# Patient Record
Sex: MALE | Race: WHITE | NOT HISPANIC OR LATINO | Employment: FULL TIME | URBAN - NONMETROPOLITAN AREA
[De-identification: names, ages, dates, MRNs, and addresses within clinical notes are randomized per-mention and may not be internally consistent; named-entity substitution may affect disease eponyms.]

---

## 2023-10-04 ENCOUNTER — APPOINTMENT (OUTPATIENT)
Dept: RADIOLOGY | Facility: HOSPITAL | Age: 33
End: 2023-10-04
Payer: COMMERCIAL

## 2023-10-04 ENCOUNTER — HOSPITAL ENCOUNTER (EMERGENCY)
Facility: HOSPITAL | Age: 33
Discharge: HOME | End: 2023-10-04
Attending: EMERGENCY MEDICINE | Admitting: EMERGENCY MEDICINE
Payer: COMMERCIAL

## 2023-10-04 VITALS
HEART RATE: 62 BPM | SYSTOLIC BLOOD PRESSURE: 111 MMHG | HEIGHT: 68 IN | TEMPERATURE: 98.4 F | RESPIRATION RATE: 16 BRPM | DIASTOLIC BLOOD PRESSURE: 66 MMHG | WEIGHT: 155 LBS | OXYGEN SATURATION: 98 % | BODY MASS INDEX: 23.49 KG/M2

## 2023-10-04 DIAGNOSIS — S63.501A SPRAIN OF RIGHT WRIST, INITIAL ENCOUNTER: ICD-10-CM

## 2023-10-04 DIAGNOSIS — S50.11XA CONTUSION OF RIGHT FOREARM, INITIAL ENCOUNTER: Primary | ICD-10-CM

## 2023-10-04 PROCEDURE — 73090 X-RAY EXAM OF FOREARM: CPT | Mod: RT,FY

## 2023-10-04 PROCEDURE — 73110 X-RAY EXAM OF WRIST: CPT | Mod: RIGHT SIDE | Performed by: RADIOLOGY

## 2023-10-04 PROCEDURE — 73590 X-RAY EXAM OF LOWER LEG: CPT | Mod: RIGHT SIDE | Performed by: RADIOLOGY

## 2023-10-04 PROCEDURE — 99284 EMERGENCY DEPT VISIT MOD MDM: CPT | Performed by: EMERGENCY MEDICINE

## 2023-10-04 PROCEDURE — 73110 X-RAY EXAM OF WRIST: CPT | Mod: RT,FY

## 2023-10-04 RX ORDER — OMEPRAZOLE 40 MG/1
40 CAPSULE, DELAYED RELEASE ORAL
COMMUNITY
End: 2023-12-12 | Stop reason: WASHOUT

## 2023-10-04 ASSESSMENT — PAIN SCALES - GENERAL
PAINLEVEL_OUTOF10: 2
PAINLEVEL_OUTOF10: 6

## 2023-10-04 ASSESSMENT — PAIN - FUNCTIONAL ASSESSMENT
PAIN_FUNCTIONAL_ASSESSMENT: 0-10
PAIN_FUNCTIONAL_ASSESSMENT: 0-10

## 2023-10-04 ASSESSMENT — COLUMBIA-SUICIDE SEVERITY RATING SCALE - C-SSRS
6. HAVE YOU EVER DONE ANYTHING, STARTED TO DO ANYTHING, OR PREPARED TO DO ANYTHING TO END YOUR LIFE?: NO
2. HAVE YOU ACTUALLY HAD ANY THOUGHTS OF KILLING YOURSELF?: NO
1. IN THE PAST MONTH, HAVE YOU WISHED YOU WERE DEAD OR WISHED YOU COULD GO TO SLEEP AND NOT WAKE UP?: NO

## 2023-10-04 NOTE — ED PROVIDER NOTES
HPI   Chief Complaint   Patient presents with    Arm Injury     Wrist and forearm pain with redness and swelling       This 32-year-old male here with a chief complaint of right forearm and wrist pain.  Patient reports that he initially injured this a few months ago while doing some exercise.  He states he has been using it repetitively at work and noticed some swelling over the distal right forearm.  He does complain of some wrist pain.  He is right-hand dominant.  He denies any fall on the outstretched hand.  He says that he was doing some boxing and punched something.  He denies any hand pain per se.  No numbness tingling or weakness of the hand.  He denies any shoulder pain upper arm pain or elbow pain.    A complete review of systems is otherwise negative except as noted above.  Past medical history is reviewed below.  Past surgical history is reviewed.  Social history is reviewed below.  Patient denies any medication allergies.                          No data recorded                Patient History   Past Medical History:   Diagnosis Date    GERD (gastroesophageal reflux disease)      Past Surgical History:   Procedure Laterality Date    HEMORRHOID SURGERY      HERNIA REPAIR       No family history on file.  Social History     Tobacco Use    Smoking status: Never    Smokeless tobacco: Never   Vaping Use    Vaping Use: Never used   Substance Use Topics    Alcohol use: Not Currently    Drug use: Never       Physical Exam   ED Triage Vitals [10/04/23 1023]   Temp Heart Rate Resp BP   36.9 °C (98.4 °F) 62 16 111/66      SpO2 Temp Source Heart Rate Source Patient Position   98 % Tympanic -- --      BP Location FiO2 (%)     -- --       Physical Exam  Vitals and nursing note reviewed.   Constitutional:       General: He is not in acute distress.     Appearance: He is well-developed.   HENT:      Head: Normocephalic and atraumatic.   Eyes:      Conjunctiva/sclera: Conjunctivae normal.   Cardiovascular:      Rate and  Rhythm: Normal rate and regular rhythm.      Heart sounds: No murmur heard.  Pulmonary:      Effort: Pulmonary effort is normal. No respiratory distress.      Breath sounds: Normal breath sounds.   Abdominal:      Palpations: Abdomen is soft.      Tenderness: There is no abdominal tenderness.   Musculoskeletal:         General: Swelling, tenderness and signs of injury present. No deformity.      Cervical back: Neck supple.      Right lower leg: No edema.      Left lower leg: No edema.      Comments: Right upper extremity revealed no significant pain palpation of the right shoulder humerus elbow or proximal forearm.  Over the distal one third of the right forearm there is some mild swelling and ecchymosis.  No wrist revealed no significant gross bony abnormality.  No snuffbox tenderness.  Right hand itself is neurovascularly intact.   Skin:     General: Skin is warm and dry.      Capillary Refill: Capillary refill takes less than 2 seconds.   Neurological:      General: No focal deficit present.      Mental Status: He is alert and oriented to person, place, and time.   Psychiatric:         Mood and Affect: Mood normal.       XR forearm right 2 views   Final Result   No acute fracture or dislocation in the current exams.        Old 5th metacarpal fracture deformity.        Tiny incidental smoothly marginated exostosis projecting from the   distal volar cortex of the humeral diaphysis.        Signed by: Martin Walden 10/4/2023 10:51 AM   Dictation workstation:   [id number]      XR wrist right 3+ views   Final Result   No acute fracture or dislocation in the current exams.        Old 5th metacarpal fracture deformity.        Tiny incidental smoothly marginated exostosis projecting from the   distal volar cortex of the humeral diaphysis.        Signed by: Martin Walden 10/4/2023 10:51 AM   Dictation workstation:   [id number]           ED Course & MDM   Diagnoses as of 10/04/23 1100   Contusion of right forearm, initial  encounter   Sprain of right wrist, initial encounter       Medical Decision Making  Patient was seen and examined by me.  X-ray imaging of the right forearm and right wrist is obtained.    X-ray imaging of the right forearm 2 bones interpreted by me.  No fracture.  No malalignment.  No acute osseous abnormality.  X-ray imaging of the right wrist interpreted by me.  No fracture or dislocation.  No gross osseous deformity or injury.    Clinically I see no evidence of acute fracture.  He says he does a lot of repetitive stuff at work.  The patient be placed in an Ace wrap on the right forearm.  Ibuprofen as needed for pain.  Rest ice elevation.  Follow-up with primary care and/or orthopedics as needed.               Dave Greenberg, DO  10/04/23 1103

## 2023-10-04 NOTE — Clinical Note
Arcenio Ny was seen and treated in our emergency department on 10/4/2023.  He may return to work on 10/05/2023.       If you have any questions or concerns, please don't hesitate to call.      Dave Greenberg, DO

## 2023-12-05 ENCOUNTER — APPOINTMENT (OUTPATIENT)
Dept: PRIMARY CARE | Facility: CLINIC | Age: 33
End: 2023-12-05
Payer: COMMERCIAL

## 2023-12-07 PROBLEM — F15.21: Chronic | Status: ACTIVE | Noted: 2023-07-26

## 2023-12-07 PROBLEM — F32.1 CURRENT MODERATE EPISODE OF MAJOR DEPRESSIVE DISORDER WITHOUT PRIOR EPISODE (MULTI): Chronic | Status: ACTIVE | Noted: 2023-07-26

## 2023-12-07 PROBLEM — T40.1X1A HEROIN OVERDOSE (MULTI): Status: ACTIVE | Noted: 2017-07-19

## 2023-12-07 PROBLEM — F41.1 GENERALIZED ANXIETY DISORDER: Status: ACTIVE | Noted: 2023-07-26

## 2023-12-07 PROBLEM — F11.10 HEROIN ABUSE (MULTI): Status: ACTIVE | Noted: 2023-12-07

## 2023-12-07 PROBLEM — F10.90 ALCOHOL USE DISORDER: Status: ACTIVE | Noted: 2017-09-28

## 2023-12-07 PROBLEM — K21.9 GERD (GASTROESOPHAGEAL REFLUX DISEASE): Status: ACTIVE | Noted: 2023-12-07

## 2023-12-07 PROBLEM — K62.5 RECTAL BLEED: Status: ACTIVE | Noted: 2017-06-12

## 2023-12-07 PROBLEM — B19.20 HEPATITIS C: Status: ACTIVE | Noted: 2023-12-07

## 2023-12-07 PROBLEM — F17.200 TOBACCO DEPENDENCE: Status: ACTIVE | Noted: 2023-12-07

## 2023-12-12 ENCOUNTER — OFFICE VISIT (OUTPATIENT)
Dept: PRIMARY CARE | Facility: CLINIC | Age: 33
End: 2023-12-12
Payer: COMMERCIAL

## 2023-12-12 VITALS
HEART RATE: 82 BPM | DIASTOLIC BLOOD PRESSURE: 65 MMHG | OXYGEN SATURATION: 98 % | BODY MASS INDEX: 24.19 KG/M2 | HEIGHT: 68 IN | WEIGHT: 159.6 LBS | SYSTOLIC BLOOD PRESSURE: 108 MMHG

## 2023-12-12 DIAGNOSIS — K21.9 GASTROESOPHAGEAL REFLUX DISEASE WITHOUT ESOPHAGITIS: ICD-10-CM

## 2023-12-12 DIAGNOSIS — R10.84 GENERALIZED ABDOMINAL PAIN: ICD-10-CM

## 2023-12-12 DIAGNOSIS — B19.20 HEPATITIS C VIRUS INFECTION WITHOUT HEPATIC COMA, UNSPECIFIED CHRONICITY: Primary | ICD-10-CM

## 2023-12-12 DIAGNOSIS — K59.09 OTHER CONSTIPATION: ICD-10-CM

## 2023-12-12 DIAGNOSIS — F32.1 CURRENT MODERATE EPISODE OF MAJOR DEPRESSIVE DISORDER WITHOUT PRIOR EPISODE (MULTI): Chronic | ICD-10-CM

## 2023-12-12 PROCEDURE — 99214 OFFICE O/P EST MOD 30 MIN: CPT | Performed by: REGISTERED NURSE

## 2023-12-12 PROCEDURE — 1036F TOBACCO NON-USER: CPT | Performed by: REGISTERED NURSE

## 2023-12-12 RX ORDER — ATORVASTATIN CALCIUM 40 MG/1
40 TABLET, FILM COATED ORAL NIGHTLY
COMMUNITY
Start: 2023-10-27 | End: 2023-12-12 | Stop reason: WASHOUT

## 2023-12-12 RX ORDER — ACETAMINOPHEN 500 MG
500 TABLET ORAL EVERY 6 HOURS PRN
COMMUNITY
Start: 2023-09-06 | End: 2023-12-12 | Stop reason: WASHOUT

## 2023-12-12 RX ORDER — MICONAZOLE NITRATE 2 %
2 CREAM (GRAM) TOPICAL
COMMUNITY
Start: 2023-10-27 | End: 2023-12-12 | Stop reason: ALTCHOICE

## 2023-12-12 RX ORDER — TRAMADOL HYDROCHLORIDE 50 MG/1
TABLET ORAL
COMMUNITY
Start: 2023-09-06 | End: 2023-12-12 | Stop reason: ALTCHOICE

## 2023-12-12 RX ORDER — CHLORHEXIDINE GLUCONATE ORAL RINSE 1.2 MG/ML
SOLUTION DENTAL
COMMUNITY
Start: 2023-09-06 | End: 2023-12-12 | Stop reason: ALTCHOICE

## 2023-12-12 RX ORDER — DOCUSATE SODIUM 100 MG/1
100 CAPSULE, LIQUID FILLED ORAL 2 TIMES DAILY
Qty: 180 CAPSULE | Refills: 3 | Status: SHIPPED | OUTPATIENT
Start: 2023-12-12 | End: 2024-12-11

## 2023-12-12 RX ORDER — MULTIPLE VITAMINS W/ MINERALS TAB 9MG-400MCG
1 TAB ORAL DAILY
COMMUNITY
Start: 2023-10-27 | End: 2023-12-12 | Stop reason: WASHOUT

## 2023-12-12 RX ORDER — IBUPROFEN/PSEUDOEPHEDRINE HCL 200MG-30MG
3 TABLET ORAL
COMMUNITY
Start: 2023-10-27 | End: 2023-12-12 | Stop reason: WASHOUT

## 2023-12-12 RX ORDER — ARIPIPRAZOLE 15 MG/1
TABLET ORAL
COMMUNITY
Start: 2023-11-11

## 2023-12-12 ASSESSMENT — ENCOUNTER SYMPTOMS
DIZZINESS: 0
EYE REDNESS: 0
COUGH: 0
SHORTNESS OF BREATH: 0
CONFUSION: 0
CHILLS: 0
WOUND: 0
EYE DISCHARGE: 0
DIARRHEA: 0
NAUSEA: 0
NERVOUS/ANXIOUS: 0
RHINORRHEA: 0
WHEEZING: 0
ABDOMINAL PAIN: 1
WEAKNESS: 0
FREQUENCY: 0
DIFFICULTY URINATING: 0
FEVER: 0
CONSTIPATION: 1
VOMITING: 0
HEADACHES: 0

## 2023-12-12 NOTE — PROGRESS NOTES
Subjective   Patient ID: Arcenio Ny is a 32 y.o. male who presents for Establish Care (Stomach issues, for a while; he feels he's going to the bathroom more than normal, Bms; denies pain but Bm's are hard; sometimes he gets pain after eating; ).    HPI   Goes by [employer]    Stomach issues: has been going on for a while, going to the bathroom more than normal. BM's are hard, sometimes having pain after eating. Started about a year ago. Had an ultrasound abdomen done and unremarkable. Bread and dairy make him worse. He is having cramping and stabbing pain in the same area. Right side abdomen.Constant pain for a while and then it goes away. Mostly after eating. He seems to go a lot more, has to go right after he eats at times. Tried omeprazole and this did not help. Taking baking soda water now.   Eats fruits and veggies, more chicken vs. Red meats. Water and black coffee and tea. Drinks about 2-3 cups of coffee per day, and 2-3 cups of tea and water during the rest.     Depression: he is taking Abilify.  He follows with Brigitte Karimi. Counseling every other week.     Hepatitis: was treated and no longer having issues     All other systems reviewed and negative for complaint unless stated above.    Surgery: Hemorrhoid and hernia   Family: none   smoker:  former   Etoh: occasional   Drug use: marijuana on occasion, used to use heroin stopped 2/6/21  He does not follow with [facility] or anything, doing well.   Review of Systems   Constitutional:  Negative for chills and fever.   HENT:  Negative for congestion, ear pain and rhinorrhea.    Eyes:  Negative for discharge and redness.   Respiratory:  Negative for cough, shortness of breath and wheezing.    Cardiovascular:  Negative for chest pain and leg swelling.   Gastrointestinal:  Positive for abdominal pain and constipation. Negative for diarrhea, nausea and vomiting.   Genitourinary:  Negative for difficulty urinating, frequency and urgency.   Musculoskeletal:   "Negative for gait problem.   Skin:  Negative for rash and wound.   Neurological:  Negative for dizziness, weakness and headaches.   Psychiatric/Behavioral:  Negative for confusion. The patient is not nervous/anxious.        Objective   /65 (BP Location: Right arm, Patient Position: Sitting, BP Cuff Size: Adult)   Pulse 82   Ht 1.727 m (5' 8\")   Wt 72.4 kg (159 lb 9.6 oz)   SpO2 98%   BMI 24.27 kg/m²     Physical Exam  Vitals reviewed.   Constitutional:       Appearance: Normal appearance.   HENT:      Head: Normocephalic.      Right Ear: Tympanic membrane, ear canal and external ear normal.      Left Ear: Tympanic membrane, ear canal and external ear normal.      Nose: No rhinorrhea.      Mouth/Throat:      Mouth: Mucous membranes are moist.      Pharynx: Oropharynx is clear.   Eyes:      Pupils: Pupils are equal, round, and reactive to light.   Cardiovascular:      Rate and Rhythm: Normal rate and regular rhythm.      Pulses: Normal pulses.   Pulmonary:      Effort: Pulmonary effort is normal.      Breath sounds: Normal breath sounds.   Abdominal:      General: Abdomen is flat. Bowel sounds are normal.      Palpations: Abdomen is soft.   Musculoskeletal:         General: No tenderness. Normal range of motion.      Right lower leg: No edema.      Left lower leg: No edema.   Lymphadenopathy:      Cervical: No cervical adenopathy.   Skin:     General: Skin is warm and dry.      Findings: No rash.   Neurological:      Mental Status: He is alert and oriented to person, place, and time.   Psychiatric:         Mood and Affect: Mood normal.         Behavior: Behavior normal.         Assessment/Plan   Diagnoses and all orders for this visit:  Hepatitis C virus infection without hepatic coma, unspecified chronicity  - no acute issues   - resolved   Current moderate episode of major depressive disorder without prior episode (CMS/HCC)  - continue following with [facility]   - continue Abilify   Other " constipation  -     docusate sodium (Colace) 100 mg capsule; Take 1 capsule (100 mg) by mouth 2 times a day.  Generalized abdominal pain  -     Food Allergy Profile IgE; Future  -     CBC and Auto Differential; Future  -     Comprehensive Metabolic Panel; Future  Gastroesophageal reflux disease without esophagitis     Follow up in 3 months to assess symptoms

## 2024-03-07 RX ORDER — BUSPIRONE HYDROCHLORIDE 5 MG/1
1 TABLET ORAL 3 TIMES DAILY
COMMUNITY
Start: 2024-02-26

## 2024-03-07 RX ORDER — DOXEPIN HYDROCHLORIDE 25 MG/1
CAPSULE ORAL
COMMUNITY
Start: 2024-02-26

## 2024-03-07 NOTE — PROGRESS NOTES
"Subjective   Patient ID: Arcenio Ny is a 33 y.o. male who presents for Follow-up (Stomach issues have decreased some since starting medication; ).    HPI   Prefers Liborio     Stomach issues: Colace is helping. More regular and not as hard to have BM's anymore. Denies any other symptoms. Did not get blood work done.     Depression: he is taking Abilify.  He follows with Brigitte Karimi. Counseling every other week.      Hepatitis: was treated and no longer having issues      All other systems reviewed and negative for complaint unless stated above.    Review of Systems    Objective   /84 (BP Location: Right arm, Patient Position: Sitting, BP Cuff Size: Adult)   Pulse 80   Ht 1.727 m (5' 8\")   Wt 75.8 kg (167 lb)   BMI 25.39 kg/m²     Physical Exam  Constitutional:       Appearance: Normal appearance.   Cardiovascular:      Rate and Rhythm: Normal rate and regular rhythm.   Pulmonary:      Effort: Pulmonary effort is normal.      Breath sounds: Normal breath sounds.   Abdominal:      General: Bowel sounds are normal.      Palpations: Abdomen is soft.   Skin:     General: Skin is warm and dry.   Neurological:      Mental Status: He is alert and oriented to person, place, and time. Mental status is at baseline.   Psychiatric:         Mood and Affect: Mood normal.         Behavior: Behavior normal.         Assessment/Plan          Hepatitis C virus infection without hepatic coma, unspecified chronicity  - no acute issues   - resolved   Current moderate episode of major depressive disorder without prior episode (CMS/HCC)  - continue following with [facility]   - continue Abilify   Other constipation  -     docusate sodium (Colace) 100 mg capsule; Take 1 capsule (100 mg) by mouth 2 times a day.  Generalized abdominal pain  -     Food Allergy Profile IgE; Future  -     CBC and Auto Differential; Future  -     Comprehensive Metabolic Panel; Future       "

## 2024-03-13 ENCOUNTER — OFFICE VISIT (OUTPATIENT)
Dept: PRIMARY CARE | Facility: CLINIC | Age: 34
End: 2024-03-13
Payer: COMMERCIAL

## 2024-03-13 VITALS
DIASTOLIC BLOOD PRESSURE: 84 MMHG | HEART RATE: 80 BPM | WEIGHT: 167 LBS | HEIGHT: 68 IN | SYSTOLIC BLOOD PRESSURE: 133 MMHG | BODY MASS INDEX: 25.31 KG/M2

## 2024-03-13 DIAGNOSIS — K21.9 GASTROESOPHAGEAL REFLUX DISEASE WITHOUT ESOPHAGITIS: Primary | ICD-10-CM

## 2024-03-13 DIAGNOSIS — K59.09 OTHER CONSTIPATION: ICD-10-CM

## 2024-03-13 PROCEDURE — 1036F TOBACCO NON-USER: CPT | Performed by: REGISTERED NURSE

## 2024-03-13 PROCEDURE — 99213 OFFICE O/P EST LOW 20 MIN: CPT | Performed by: REGISTERED NURSE

## 2024-03-13 RX ORDER — IBUPROFEN 600 MG/1
TABLET ORAL
COMMUNITY
Start: 2024-02-01

## 2025-03-17 ENCOUNTER — APPOINTMENT (OUTPATIENT)
Dept: PRIMARY CARE | Facility: CLINIC | Age: 35
End: 2025-03-17
Payer: COMMERCIAL

## 2025-06-17 NOTE — PROGRESS NOTES
Subjective   Patient ID: Arcenio Ny is a 34 y.o. male who presents for No chief complaint on file..    [facility] [place]    Prefers Liborio      Stomach issues: Colace is helping. More regular and not as hard to have BM's anymore. Denies any other symptoms. Did not get blood work done.      Depression: he is taking Abilify.  He follows with Brigitte Karimi. Counseling every other week.      Hepatitis: was treated and no longer having issues      All other systems reviewed and negative for complaint unless stated above.    Review of systems completed and unremarkable other than what is documented in [facility] [place].    Objective   There were no vitals taken for this visit.    No data recorded    Physical Exam    Gen: No acute distress, alert and oriented x3, pleasant   HEENT: moist mucous membranes, b/l external auditory canals are clear of debris, TMs within normal limits, no oropharyngeal lesions, eomi, perrla   Neck: thyroid within normal limits, no lymphadenopathy   CV: RRR, normal S1/S2, no murmur   Resp: Clear to auscultation bilaterally, no wheezes or rhonchi appreciated  Abd: soft, nontender, non-distended, no guarding/rigidity, bowel sounds present  Extr: no edema, no calf tenderness  Derm: Skin is warm and dry, no rashes appreciated  Psych: mood is good, affect is congruent, good hygiene, normal speech and eye contact  Neuro: cranial nerves grossly intact, normal gait      Assessment/Plan   {Assess/PlanSmartLinks:68201}      Hepatitis C virus infection without hepatic coma, unspecified chronicity  - no acute issues   - resolved   Current moderate episode of major depressive disorder without prior episode (CMS/HCC)  - continue following with [facility]   - continue Abilify   Other constipation  -     docusate sodium (Colace) 100 mg capsule; Take 1 capsule (100 mg) by mouth 2 times a day   urticaria  -     [facility] MISCELLANEOUS TEST (ROOM TEMPERATURE); Future  Health management deficit  -     Lipid Panel; Future  Screening for diabetes mellitus (DM)  -     Hemoglobin A1C; Future  -     Comprehensive metabolic panel; Future  Hepatitis C virus infection without hepatic coma, unspecified chronicity  - no acute issues   - resolved   Current moderate episode of major depressive disorder without prior episode (CMS/HCC)  - continue following with [facility]

## 2025-06-24 ENCOUNTER — APPOINTMENT (OUTPATIENT)
Dept: PRIMARY CARE | Facility: CLINIC | Age: 35
End: 2025-06-24
Payer: COMMERCIAL

## 2025-06-24 VITALS
SYSTOLIC BLOOD PRESSURE: 124 MMHG | WEIGHT: 163.6 LBS | BODY MASS INDEX: 24.88 KG/M2 | HEART RATE: 72 BPM | DIASTOLIC BLOOD PRESSURE: 73 MMHG

## 2025-06-24 DIAGNOSIS — B19.20 HEPATITIS C VIRUS INFECTION WITHOUT HEPATIC COMA, UNSPECIFIED CHRONICITY: ICD-10-CM

## 2025-06-24 DIAGNOSIS — Z13.1 SCREENING FOR DIABETES MELLITUS (DM): ICD-10-CM

## 2025-06-24 DIAGNOSIS — L50.1 ACUTE IDIOPATHIC URTICARIA: Primary | ICD-10-CM

## 2025-06-24 DIAGNOSIS — Z78.9 HEALTH MANAGEMENT DEFICIT: ICD-10-CM

## 2025-06-24 PROBLEM — T40.1X1A HEROIN OVERDOSE (MULTI): Status: RESOLVED | Noted: 2017-07-19 | Resolved: 2025-06-24

## 2025-06-24 PROBLEM — F15.21: Chronic | Status: RESOLVED | Noted: 2023-07-26 | Resolved: 2025-06-24

## 2025-06-24 PROCEDURE — 99395 PREV VISIT EST AGE 18-39: CPT | Performed by: REGISTERED NURSE

## 2025-06-24 PROCEDURE — 1036F TOBACCO NON-USER: CPT | Performed by: REGISTERED NURSE

## 2025-06-24 ASSESSMENT — PATIENT HEALTH QUESTIONNAIRE - PHQ9
SUM OF ALL RESPONSES TO PHQ9 QUESTIONS 1 & 2: 0
2. FEELING DOWN, DEPRESSED OR HOPELESS: NOT AT ALL
1. LITTLE INTEREST OR PLEASURE IN DOING THINGS: NOT AT ALL

## 2026-06-23 ENCOUNTER — APPOINTMENT (OUTPATIENT)
Dept: PRIMARY CARE | Facility: CLINIC | Age: 36
End: 2026-06-23
Payer: COMMERCIAL